# Patient Record
Sex: FEMALE | Race: WHITE
[De-identification: names, ages, dates, MRNs, and addresses within clinical notes are randomized per-mention and may not be internally consistent; named-entity substitution may affect disease eponyms.]

---

## 2022-12-16 ENCOUNTER — HOSPITAL ENCOUNTER (EMERGENCY)
Dept: HOSPITAL 26 - MED | Age: 62
Discharge: HOME | End: 2022-12-16
Payer: COMMERCIAL

## 2022-12-16 VITALS — HEIGHT: 60 IN | BODY MASS INDEX: 35.73 KG/M2 | WEIGHT: 182 LBS

## 2022-12-16 VITALS — SYSTOLIC BLOOD PRESSURE: 134 MMHG | DIASTOLIC BLOOD PRESSURE: 77 MMHG

## 2022-12-16 VITALS — SYSTOLIC BLOOD PRESSURE: 144 MMHG | DIASTOLIC BLOOD PRESSURE: 55 MMHG
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
## 2022-12-16 DIAGNOSIS — S43.004A: Primary | ICD-10-CM

## 2022-12-16 PROCEDURE — 94760 N-INVAS EAR/PLS OXIMETRY 1: CPT

## 2022-12-16 PROCEDURE — 73030 X-RAY EXAM OF SHOULDER: CPT

## 2022-12-16 PROCEDURE — 73060 X-RAY EXAM OF HUMERUS: CPT

## 2022-12-16 PROCEDURE — 99291 CRITICAL CARE FIRST HOUR: CPT

## 2022-12-16 PROCEDURE — 96372 THER/PROPH/DIAG INJ SC/IM: CPT

## 2022-12-16 PROCEDURE — 23650 CLTX SHO DSLC W/MNPJ WO ANES: CPT

## 2022-12-16 PROCEDURE — G0500 MOD SEDAT ENDO SERVICE >5YRS: HCPCS

## 2022-12-16 PROCEDURE — 96374 THER/PROPH/DIAG INJ IV PUSH: CPT

## 2022-12-16 PROCEDURE — 94770: CPT

## 2022-12-16 NOTE — NUR
AT BEDSIDE WITH PATIENT FOR CONSCIOUS SEDATION FOR 20 MINS. PATIENT DESATED 
DURING SEDATION TO 88% AND PATIENT WAS PUT ON 6L. WILL CONTINUE TO MONITOR 
PATIENT AND TITRATE AS PATIENT WAKES UP.

## 2022-12-16 NOTE — NUR
61 YO F BIB  WITH C/C OF 10/10 RT SHOULD PAIN S/P FALL X 1.5 HOURS AGO. 
PT STATES SHE WAS WALKING AND FELL INTO A HOLE, STATES ALL HER WEIGHT LANDED ON 
RIGHT SHOULDER. DENIES HEAD INJURY. DENIES TAKING MEDICATION FOR PAIN. 



DENIES HX, RX AND ALLERGIES

## 2022-12-16 NOTE — NUR
Patient discharged with v/s stable. Written and verbal after care instructions 
given and explained FOR DISLOACTED SHOULDER. 

Patient alert, oriented and verbalized understanding of instructions. Wheel 
Chair Assisted with to car. All questions addressed prior to discharge. ID band 
removed. Patient advised to follow up with PMD. Rx of IBUPROFEN, LIDODERM, 
ZOFRAN, AND TRAMADOL given. Patient educated on indication of medication 
including possible reaction and side effects. Opportunity to ask questions 
provided and answered.

## 2022-12-16 NOTE — NUR
DR.DELA FRANK GAVE VERBAL ORDER FOR KETAMINE 500MG/5ML, 80MG ADMIN BY MANUEL SCHERER DURING PROCEDURE.